# Patient Record
(demographics unavailable — no encounter records)

---

## 2025-04-03 NOTE — REASON FOR VISIT
[Initial Evaluation] : an initial evaluation [FreeTextEntry1] : L ankle pain [Patient] : patient [Mother] : mother

## 2025-04-03 NOTE — DATA REVIEWED
[de-identified] : L ankle XRs taken in office on 4/2/25 were viewed and independently interpreted: patient is skeletally immature, the epiphyses and physes are intact, there is no fracture, dislocation, or bony abnormalities appreciated, the soft tissues are unremarkable
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
## 2025-04-03 NOTE — ASSESSMENT
[FreeTextEntry1] : BRENNA is a 11 year old F with BL flexible flatfeet and a L ankle sprain sustained a few weeks ago.  Today's visit included obtaining the history from the child and parent, due to the child's age, the child could not be considered a reliable historian, requiring the parent to act as an independent historian. The condition, natural history, and prognosis were explained to the patient and family. The clinical findings and images were reviewed with the family.   XRs of the L ankle show no osseous abnormalities. Clinically she has flexible flatfeet. The patient and family were educated regarding pes planovalgus or flexible flat feet. There is no orthopedic concern at this time. Over the counter medical arch supports may be used in feet that are painful. However they are not recommended in asymptomatic feet as they may make a non-painful foot painful. If she does well with medial arch supports and would like additional support, a script for UCBL's may be provided. The family was educated that orthopedic inserts do not change the flexibility of the arch, rather provide support when being used.   With regards to her L ankle sprain, recommendation is for a script for PT. She may participate in activities as tolerated, and ok to sit out if having pain.  F/u in 6 weeks.  All questions were answered, the family expresses understanding and agrees with the plan of care.   This note was generated using Dragon medical dictation software. A reasonable effort has been made for proofreading its contents, but typos may still remain. If there are any questions or points of clarification needed please do not hesitate to contact my office.

## 2025-04-03 NOTE — PHYSICAL EXAM
[FreeTextEntry1] : BL feet: No TTP along medial arch Present of arch with sitting Supple subtalar motion + flatfoot with weight bearing + too many toes sign with weight bearing Hindfoot valgus corrects to hindfoot varus with heel rise  L ankle: Skin intact No ecchymosis or bruising No soft tissue swelling + TTP over ATFL<deltoid ligament No TTP over the CFL No TTP over prox fibula, distal fibula No pain with compression of the syndesmosis No TTP over medial malleolus or anterior tibia Negative anterior drawer with the foot plantarflexed and dorsiflexed Negative increased inversion > 15 compared to the contralateral side